# Patient Record
Sex: FEMALE | Race: WHITE
[De-identification: names, ages, dates, MRNs, and addresses within clinical notes are randomized per-mention and may not be internally consistent; named-entity substitution may affect disease eponyms.]

---

## 2019-02-07 ENCOUNTER — HOSPITAL ENCOUNTER (OUTPATIENT)
Dept: HOSPITAL 75 - RAD | Age: 44
End: 2019-02-07
Attending: NURSE PRACTITIONER
Payer: COMMERCIAL

## 2019-02-07 DIAGNOSIS — N85.8: ICD-10-CM

## 2019-02-07 DIAGNOSIS — Z12.31: Primary | ICD-10-CM

## 2019-02-07 PROCEDURE — 76856 US EXAM PELVIC COMPLETE: CPT

## 2019-02-07 PROCEDURE — 77067 SCR MAMMO BI INCL CAD: CPT

## 2019-02-07 PROCEDURE — 76830 TRANSVAGINAL US NON-OB: CPT

## 2019-02-07 NOTE — DIAGNOSTIC IMAGING REPORT
INDICATION: Right-sided pelvic pain.



Pelvic sonography is performed with transabdominal and

transvaginal views.



The uterus measures 11.6 x 7.1 x 5.9 cm. Endometrium appears

thickened measuring 1.8 cm. The right ovary appears normal and

measures 4.4 x 2.3 x 2.6 cm. The right ovary contains color flow.

Left ovary could not be visualized.



IMPRESSION: Thickened endometrium, correlate with menstrual

phase. No uterine mass is seen. Normal-appearing right ovary,

left ovary not visualized. There is no free fluid.



Dictated by: 



  Dictated on workstation # [id number]

## 2019-03-26 ENCOUNTER — HOSPITAL ENCOUNTER (OUTPATIENT)
Dept: HOSPITAL 75 - PREOP | Age: 44
Discharge: HOME | End: 2019-03-26
Attending: OBSTETRICS & GYNECOLOGY
Payer: COMMERCIAL

## 2019-03-26 VITALS — WEIGHT: 247 LBS | HEIGHT: 68 IN | BODY MASS INDEX: 37.44 KG/M2

## 2019-03-26 DIAGNOSIS — Z01.818: Primary | ICD-10-CM

## 2019-03-28 ENCOUNTER — HOSPITAL ENCOUNTER (OUTPATIENT)
Dept: HOSPITAL 75 - SDC | Age: 44
Discharge: HOME | End: 2019-03-28
Attending: OBSTETRICS & GYNECOLOGY
Payer: COMMERCIAL

## 2019-03-28 VITALS — DIASTOLIC BLOOD PRESSURE: 69 MMHG | SYSTOLIC BLOOD PRESSURE: 117 MMHG

## 2019-03-28 VITALS — SYSTOLIC BLOOD PRESSURE: 114 MMHG | DIASTOLIC BLOOD PRESSURE: 70 MMHG

## 2019-03-28 VITALS — DIASTOLIC BLOOD PRESSURE: 79 MMHG | SYSTOLIC BLOOD PRESSURE: 114 MMHG

## 2019-03-28 VITALS — WEIGHT: 242 LBS | HEIGHT: 68 IN | BODY MASS INDEX: 36.68 KG/M2

## 2019-03-28 VITALS — DIASTOLIC BLOOD PRESSURE: 56 MMHG | SYSTOLIC BLOOD PRESSURE: 96 MMHG

## 2019-03-28 VITALS — SYSTOLIC BLOOD PRESSURE: 117 MMHG | DIASTOLIC BLOOD PRESSURE: 69 MMHG

## 2019-03-28 DIAGNOSIS — N93.9: Primary | ICD-10-CM

## 2019-03-28 DIAGNOSIS — E66.9: ICD-10-CM

## 2019-03-28 DIAGNOSIS — N84.0: ICD-10-CM

## 2019-03-28 DIAGNOSIS — R93.89: ICD-10-CM

## 2019-03-28 LAB
BASOPHILS # BLD AUTO: 0 10^3/UL (ref 0–0.1)
BASOPHILS NFR BLD AUTO: 0 % (ref 0–10)
EOSINOPHIL # BLD AUTO: 0.2 10^3/UL (ref 0–0.3)
EOSINOPHIL NFR BLD AUTO: 3 % (ref 0–10)
ERYTHROCYTE [DISTWIDTH] IN BLOOD BY AUTOMATED COUNT: 13.7 % (ref 10–14.5)
HCT VFR BLD CALC: 40 % (ref 35–52)
HGB BLD-MCNC: 14.1 G/DL (ref 11.5–16)
LYMPHOCYTES # BLD AUTO: 2.4 X 10^3 (ref 1–4)
LYMPHOCYTES NFR BLD AUTO: 38 % (ref 12–44)
MANUAL DIFFERENTIAL PERFORMED BLD QL: NO
MCH RBC QN AUTO: 32 PG (ref 25–34)
MCHC RBC AUTO-ENTMCNC: 35 G/DL (ref 32–36)
MCV RBC AUTO: 89 FL (ref 80–99)
MONOCYTES # BLD AUTO: 0.6 X 10^3 (ref 0–1)
MONOCYTES NFR BLD AUTO: 9 % (ref 0–12)
NEUTROPHILS # BLD AUTO: 3.2 X 10^3 (ref 1.8–7.8)
NEUTROPHILS NFR BLD AUTO: 50 % (ref 42–75)
PLATELET # BLD: 313 10^3/UL (ref 130–400)
PMV BLD AUTO: 10.5 FL (ref 7.4–10.4)
WBC # BLD AUTO: 6.4 10^3/UL (ref 4.3–11)

## 2019-03-28 PROCEDURE — 86850 RBC ANTIBODY SCREEN: CPT

## 2019-03-28 PROCEDURE — 87081 CULTURE SCREEN ONLY: CPT

## 2019-03-28 PROCEDURE — 84703 CHORIONIC GONADOTROPIN ASSAY: CPT

## 2019-03-28 PROCEDURE — 36415 COLL VENOUS BLD VENIPUNCTURE: CPT

## 2019-03-28 PROCEDURE — 85025 COMPLETE CBC W/AUTO DIFF WBC: CPT

## 2019-03-28 PROCEDURE — 86901 BLOOD TYPING SEROLOGIC RH(D): CPT

## 2019-03-28 PROCEDURE — 86900 BLOOD TYPING SEROLOGIC ABO: CPT

## 2019-03-28 PROCEDURE — 88305 TISSUE EXAM BY PATHOLOGIST: CPT

## 2019-03-28 NOTE — XMS REPORT
Continuity of Care Document

 Created on: 2019



ASHLEIGH MARCANO

External Reference #: [id number]

: 1975

Sex: Female



Demographics







 Address  [address] [place]  [address]

 

 Home Phone  [phone] x

 

 Preferred Language  Unknown

 

 Marital Status  Unknown

 

 Restoration Affiliation  Unknown

 

 Race  Unknown

 

 Ethnic Group  Unknown





Author







 Author  [facility]

 

 Organization  [facility]

 

 Address  Unknown

 

 Phone  Unavailable



              



Allergies

      





 Active            Description            Code            Type            
Severity            Reaction            Onset            Reported/Identified   
         Relationship to Patient            Clinical Status        

 

 Yes            No Known Drug Allergies            [id number]            Drug 
Allergy            Unknown            N/A                         2013   
                               



                  



Medications

      



There is no data.                  



Problems

      





 Date Dx Coded            Attending            Type            Code            
Diagnosis            Diagnosed By        

 

 2013            LAKEISHA JONES DO            Ot            599.0          
  URIN TRACT INFECTION NOS                     

 

 2013            LAKEISHA JONES DO            Ot            788.1          
  DYSURIA                     

 

 2015                         Ot            708.9            URTICARIA 
NOS                     

 

 2016            BIBI GARCIA MD            Ot            L97.122    
        NON-PRESSURE CHRONIC ULCER OF LEFT THIGH                     

 

 2016            BIBI GARCIA MD            Ot            T63.331A   
         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                     

 

 2017                         Ot            L97.122            NON-
PRESSURE CHRONIC ULCER OF LEFT THIGH                     

 

 2017                         Ot            T63.331A            TOXIC 
EFFECT OF VENOM OF BROWN RECLUSE S                     

 

 2019                         Ot            L97.122            NON-
PRESSURE CHRONIC ULCER OF LEFT THIGH                     

 

 2019                         Ot            T63.331A            TOXIC 
EFFECT OF VENOM OF BROWN RECLUSE S                     

 

 2019            MEI VALVERDE            Ot            N85.8        
    OTHER SPECIFIED NONINFLAMMATORY DISORDER                     

 

 2019            MEI VALVERDE            Ot            Z12.31       
     ENCNTR SCREEN MAMMOGRAM FOR MALIGNANT NE                     

 

 2019            MEI VALVERDE            Ot            N85.8        
    OTHER SPECIFIED NONINFLAMMATORY DISORDER                     

 

 2019            MEI VALVERDE            Ot            Z12.31       
     ENCNTR SCREEN MAMMOGRAM FOR MALIGNANT NE                     

 

 2019            BIBI GLOVER DO            Ot            Z01.818   
         ENCOUNTER FOR OTHER PREPROCEDURAL EXAMIN                     

 

 2019            BIBI GLOVER DO            Ot            Z01.818   
         ENCOUNTER FOR OTHER PREPROCEDURAL EXAMIN                     



                                              



Procedures

      



There is no data.                  



Results

      



There is no data.              



Encounters

      





 ACCT No.            Visit Date/Time            Discharge            Status    
        Pt. Type            Provider            Facility            Loc./Unit  
          Complaint        

 

 [id number]            2019 09:59:00            2019 15:34:00    
        DIS            Outpatient            BIBI GLOVER DO            
[facility]            PREOP            ABNORMAL UTERINE 
BLEEDING        

 

 [id number]            2019 14:50:00            2019 23:59:59    
        CLS            Outpatient            MEI VALVERDE            [facility]            RAD            SCREENING/RLQ DISCOMFORT
        

 

 [id number]            2016 08:53:00            2016 23:59:59    
        CLS            Outpatient            BIBI GARCIA MD            [facility]            WOUNDCARE                     

 

 [id number]            2013 23:46:00            2013 01:21:00    
        DIS            Emergency            ROBERT LAKEISHA OROZCO JACKELIN            [facility]            ER            UTI        

 

 [id number]            2019 10:15:00                         PEN       
     Preadmit            BIBI GLOVER DO            [facility]            SDC            ABNORMAL UTERINE BLEEDING        

 

 [id number]            2016 00:00:00                                   
   Document Registration                                                       
     

 

 [id number]            2015 22:26:00                                   
   Document Registration

## 2019-03-28 NOTE — ANESTHESIA-GENERAL POST-OP
General


Patient Condition


Mental Status/LOC:  Same as Preop


Cardiovascular:  Satisfactory


Nausea/Vomiting:  Absent


Respiratory:  Satisfactory


Pain:  Controlled


Complications:  Absent





Post Op Complications


Complications


None





Follow Up Care/Instructions


Patient Instructions


None needed.





Anesthesia/Patient Condition


Patient Condition


Patient was seen after the procedure and she was doing well, no complaints, 

stable vital signs, no apparent adverse anesthesia problems.











KAMLESH RANKIN DO Mar 28, 2019 14:47

## 2019-03-28 NOTE — OPERATIVE REPORT
DATE OF SERVICE:  03/28/2019



PREOPERATIVE DIAGNOSES:

1.  A 44-year-old female with abnormal uterine bleeding.

2.  Thickened endometrium.



POSTOPERATIVE DIAGNOSES:

1.  A 44-year-old female with abnormal uterine bleeding.

2.  Thickened endometrium.

3.  Endometrial polypoid tissue.



PROCEDURE PERFORMED:

D and C hysteroscopy.



SURGEON:

Bibi Glover DO.



ANESTHESIA:

General endotracheal.



ESTIMATED BLOOD LOSS:

Minimal.



URINE OUTPUT:

200 mL clear at the end of the procedure.



FLUIDS:

600 mL lactated Ringer solution.



FINDINGS:

Two separate polypoid structures in the endometrial cavity, otherwise grossly

normal-appearing endometrial cavity without evidence of submucosal fibroid or

other neoplasm.



SPECIMENS SENT:

Endometrial curettings.



INDICATIONS FOR PROCEDURE:

This 44-year-old female was sent to my office for evaluation and referral from

Angeles Delgado, our nurse practitioner for finding of abnormal uterine bleeding over

the age of 39 and thickened endometrium on ultrasound.  I discussed with the

patient to perform an EMB in the office versus proceed with D and C in the

operating room.  We did discuss how D and C could be both curative and

diagnostic versus EMB would probably only serve as a diagnostic test primarily. 

All of her questions were answered with her  present including recovery

time frame, risks of the procedure as well as follow up.  After everything was

discussed and all of their questions were answered, consent was obtained, the

patient was taken to the operating room.



DESCRIPTION OF PROCEDURE:

Once in the operating room, general anesthesia was found to be adequate, placed

in dorsal lithotomy position, prepped and draped in normal sterile fashion.  A

weighted speculum was inserted in the patient's vagina.  A right angle retractor

was used to visualize the cervix, which was grasped at 12 o'clock position using

a long Allis clamp.  A paracervical block was then performed at 3 o'clock and 9

o'clock positions.  A 0.25% Marcaine, 5 mL were injected at each site after

which I then proceeded to sound the uterine cavity depth, which is found to be 8

cm.  I then gently dilated the cervix using Hegar dilators for approximately 5-8

mm, allowing me to place a hysteroscope.  Using the [facility] fluid management

system and normal saline as my visual medium, I advanced the hysteroscope in the

intrauterine cavity and able to identify my findings as listed above.  After

this was done, I removed the hysteroscope and proceeded with performing a

curetting directed toward these 2 polyps.  A small to moderate amount of

polypoid tissue was removed by doing so after which there was no active bleeding

noted from any of my dissection planes.  The patient tolerated the procedure

well and sent to Recovery in stable condition.  All other instruments were

removed from the patient's vagina at that point.  Lap and sponge counts were

correct at the end of the procedure.  Instrument counts were correct as well.





Job ID: [id number]

DocumentID: [id number]

Dictated Date:  03/28/2019 11:42:57

Transcription Date: 03/28/2019 20:18:58

Dictated By: BIBI GLOVER DO

## 2019-03-28 NOTE — DISCHARGE INST-WOMEN'S SERVICE
Discharge Inst-Women's Serv


Depart Medication/Instructions


New, Converted or Re-Newed RX:  Other (no scripts patient to take own Advil as 

needed)





Consults/Follow Up


Orders/Referrals


Dr. Glover in 1-2 weeks





Diet


Discharge Diet:  No Restrictions


Symptoms to Report to :  Bleeding Excessive, Pain Increased, Fever Over 101 

Degrees F, Vaginal Bleeding Increase, Questions/Concerns


For Any Problems or Questions:  Contact Your Physician











BIBI GLOVER DO Mar 28, 2019 09:42

## 2021-05-18 ENCOUNTER — HOSPITAL ENCOUNTER (OUTPATIENT)
Dept: HOSPITAL 75 - RAD | Age: 46
End: 2021-05-18
Attending: OBSTETRICS & GYNECOLOGY
Payer: COMMERCIAL

## 2021-05-18 DIAGNOSIS — Z12.31: Primary | ICD-10-CM

## 2021-05-18 DIAGNOSIS — N88.8: ICD-10-CM

## 2021-05-18 PROCEDURE — 76830 TRANSVAGINAL US NON-OB: CPT

## 2021-05-18 PROCEDURE — 76856 US EXAM PELVIC COMPLETE: CPT

## 2021-05-18 PROCEDURE — 77067 SCR MAMMO BI INCL CAD: CPT

## 2021-05-18 PROCEDURE — 77063 BREAST TOMOSYNTHESIS BI: CPT

## 2021-05-18 NOTE — DIAGNOSTIC IMAGING REPORT
INDICATION: Routine screening.



COMPARISON is made with prior mammogram 02/07/2019.



2-D and 3-D bilateral screening mammography was performed with

CAD.



Both breasts are heterogeneously dense, limiting the sensitivity

of mammography. The parenchymal pattern is stable. No mass or

malignant appearing microcalcifications are seen. Axillae are

unremarkable.



IMPRESSION: BI-RADS Category 1



No mammographic features suspicious for malignancy are

identified.



ACR BI-RADS Category 1: Negative.

Result letter will be mailed to the patient.

Note:  At least 10% of breast cancer is not imaged by

mammography.



Dictated by: 



  Dictated on workstation # [id number]

## 2021-05-18 NOTE — DIAGNOSTIC IMAGING REPORT
PROCEDURE: US Non-ob pelvis comp/trans.



TECHNIQUE: Multiple realtime grayscale images were obtained of

the pelvis in various projections endovaginally.



Transabdominal imaging was also performed.



INDICATION: Right-sided pelvic pain during menses.



COMPARISON: Comparison with 02/07/2019, pelvic ultrasound.



FINDINGS: Uterus measures 8.6 x 4.8 x 6.4 cm. Endometrial stripe

is 1.4 cm. There is a trace of endometrial fluid. There are no

endometrial or myometrial masses. The right ovary measures 3 x

1.2 x 2.4 cm. The left ovary measures 3.8 x 2 x 2 cm. There is

normal blood flow to both ovaries. There are no ovarian cysts.

There is no free fluid in the pelvis. There is noted a simple

nabothian cyst in the cervix measuring less than 1 cm.



IMPRESSION:

1. No evidence of ovarian cysts. There is a trace of free fluid

in the cul-de-sac.

2. Simple nabothian cyst in the cervix.



Dictated by: 



  Dictated on workstation # [id number]

## 2022-02-23 ENCOUNTER — HOSPITAL ENCOUNTER (OUTPATIENT)
Dept: HOSPITAL 75 - RAD | Age: 47
End: 2022-02-23
Attending: FAMILY MEDICINE
Payer: COMMERCIAL

## 2022-02-23 DIAGNOSIS — M48.02: Primary | ICD-10-CM

## 2022-02-23 DIAGNOSIS — M47.812: ICD-10-CM

## 2022-02-23 DIAGNOSIS — M50.121: ICD-10-CM

## 2022-02-23 PROCEDURE — 72141 MRI NECK SPINE W/O DYE: CPT

## 2022-02-23 PROCEDURE — 73030 X-RAY EXAM OF SHOULDER: CPT

## 2022-02-23 NOTE — DIAGNOSTIC IMAGING REPORT
INDICATION:  LEFT ARM PARESTHESIA LT SHOULDER PAIN CERVICALGIA

WITH LEFT RADICULOPATHY



TECHNIQUE: Three  views of the  left shoulder  



CORRELATION STUDY:  None



FINDINGS: 

The glenohumeral and acromioclavicular alignment are maintained

and unremarkable. There is no evidence for acute fracture or

dislocation.  The visualized soft tissues are unremarkable.



IMPRESSION: 

1.  Negative for acute bony abnormality about the shoulder.



Dictated by: 



  Dictated on workstation # [id number]

## 2022-02-23 NOTE — DIAGNOSTIC IMAGING REPORT
PROCEDURE: 

MR imaging cervical spine without contrast.



TECHNIQUE: 

Multiplanar, multisequence MR imaging of the cervical spine was

performed without contrast.



INDICATION:   

Neck pain, left shoulder pain, hand tingling and numbness. 



FINDINGS:

The cervical vertebral statures and alignment are normal. The

marrow signal intensity is normal. The cervical spinal cord

itself has normal volume, morphology, and signal intensity. The

ligamentous structures show no evidence for their rupture. There

is no paraspinal mass, hemorrhage, or fluid collection. There is

no acute intrathecal abnormality.



The craniocervical relationship at the C1 and C2 levels is

normal.



C2-C3:

This level and disc are normal. No stenosis.



C3-C4: 

This level and disc are normal. No stenosis.



C4-C5: 

There is disc desiccation, slight disc stature loss, and mild

circumferential annular bulge. There is some mild right-sided

uncovertebral joint spurring. There is no significant canal

stenosis. The left neuroforamen is widely patent. The right is

mildly narrowed.



C5-C6: 

There is disc desiccation, disc stature loss, and broad-based

posterior disc protrusion elevating the unruptured posterior

longitudinal ligament. Effacing and flattening of the ventral

thecal sac result in severe canal stenosis. There is moderate

left and severe right neuroforaminal narrowing with right greater

than left uncovertebral joint spurring.



C6-C7: 

Disc desiccation, stature loss, and broad-based posterior bulging

flattens and effaces the ventral thecal sac and severely stenose

the spinal canal. This mildly effaces the ventral cord surface

but no cord edema. There is moderate to severe left and moderate

right neuroforaminal stenoses.



C7-T1: 

This level and disc are unremarkable. There is no stenosis.



IMPRESSION:

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

spinal stenosis detailed above. No bone contusion, marrow edema,

or cord signal pathology.



Dictated by: 



  Dictated on workstation # [id number]

## 2022-08-30 ENCOUNTER — HOSPITAL ENCOUNTER (OUTPATIENT)
Dept: HOSPITAL 75 - ER | Age: 47
LOS: 1 days | Discharge: HOME | End: 2022-08-31
Attending: SURGERY
Payer: COMMERCIAL

## 2022-08-30 VITALS — SYSTOLIC BLOOD PRESSURE: 153 MMHG | DIASTOLIC BLOOD PRESSURE: 76 MMHG

## 2022-08-30 VITALS — SYSTOLIC BLOOD PRESSURE: 125 MMHG | DIASTOLIC BLOOD PRESSURE: 82 MMHG

## 2022-08-30 VITALS — SYSTOLIC BLOOD PRESSURE: 130 MMHG | DIASTOLIC BLOOD PRESSURE: 80 MMHG

## 2022-08-30 VITALS — SYSTOLIC BLOOD PRESSURE: 132 MMHG | DIASTOLIC BLOOD PRESSURE: 67 MMHG

## 2022-08-30 VITALS — DIASTOLIC BLOOD PRESSURE: 64 MMHG | SYSTOLIC BLOOD PRESSURE: 117 MMHG

## 2022-08-30 VITALS — DIASTOLIC BLOOD PRESSURE: 81 MMHG | SYSTOLIC BLOOD PRESSURE: 137 MMHG

## 2022-08-30 VITALS — BODY MASS INDEX: 30.11 KG/M2 | WEIGHT: 203.27 LBS | HEIGHT: 69.02 IN

## 2022-08-30 VITALS — DIASTOLIC BLOOD PRESSURE: 67 MMHG | SYSTOLIC BLOOD PRESSURE: 132 MMHG

## 2022-08-30 VITALS — SYSTOLIC BLOOD PRESSURE: 128 MMHG | DIASTOLIC BLOOD PRESSURE: 82 MMHG

## 2022-08-30 VITALS — DIASTOLIC BLOOD PRESSURE: 81 MMHG | SYSTOLIC BLOOD PRESSURE: 129 MMHG

## 2022-08-30 VITALS — SYSTOLIC BLOOD PRESSURE: 124 MMHG | DIASTOLIC BLOOD PRESSURE: 77 MMHG

## 2022-08-30 VITALS — DIASTOLIC BLOOD PRESSURE: 80 MMHG | SYSTOLIC BLOOD PRESSURE: 134 MMHG

## 2022-08-30 VITALS — SYSTOLIC BLOOD PRESSURE: 130 MMHG | DIASTOLIC BLOOD PRESSURE: 60 MMHG

## 2022-08-30 DIAGNOSIS — K80.12: Primary | ICD-10-CM

## 2022-08-30 LAB
ALBUMIN SERPL-MCNC: 4.1 GM/DL (ref 3.2–4.5)
ALP SERPL-CCNC: 87 U/L (ref 40–136)
ALT SERPL-CCNC: 372 U/L (ref 0–55)
APTT PPP: (no result) S
BACTERIA #/AREA URNS HPF: NEGATIVE /HPF
BARBITURATES UR QL: NEGATIVE
BASOPHILS # BLD AUTO: 0 10^3/UL (ref 0–0.1)
BASOPHILS NFR BLD AUTO: 0 % (ref 0–10)
BENZODIAZ UR QL SCN: NEGATIVE
BILIRUB SERPL-MCNC: 2.2 MG/DL (ref 0.1–1)
BILIRUB UR QL STRIP: (no result)
BUN/CREAT SERPL: 15
CALCIUM SERPL-MCNC: 9.2 MG/DL (ref 8.5–10.1)
CHLORIDE SERPL-SCNC: 103 MMOL/L (ref 98–107)
CO2 SERPL-SCNC: 23 MMOL/L (ref 21–32)
COCAINE UR QL: NEGATIVE
CREAT SERPL-MCNC: 0.71 MG/DL (ref 0.6–1.3)
EOSINOPHIL # BLD AUTO: 0.1 10^3/UL (ref 0–0.3)
EOSINOPHIL NFR BLD AUTO: 1 % (ref 0–10)
FIBRINOGEN PPP-MCNC: CLEAR MG/DL
GFR SERPLBLD BASED ON 1.73 SQ M-ARVRAT: 105 ML/MIN
GLUCOSE SERPL-MCNC: 126 MG/DL (ref 70–105)
GLUCOSE UR STRIP-MCNC: NEGATIVE MG/DL
HCT VFR BLD CALC: 42 % (ref 35–52)
HGB BLD-MCNC: 14.5 G/DL (ref 11.5–16)
KETONES UR QL STRIP: (no result)
LEUKOCYTE ESTERASE UR QL STRIP: NEGATIVE
LIPASE SERPL-CCNC: 28 U/L (ref 8–78)
LYMPHOCYTES # BLD AUTO: 1.9 10^3/UL (ref 1–4)
LYMPHOCYTES NFR BLD AUTO: 21 % (ref 12–44)
MANUAL DIFFERENTIAL PERFORMED BLD QL: NO
MCH RBC QN AUTO: 32 PG (ref 25–34)
MCHC RBC AUTO-ENTMCNC: 34 G/DL (ref 32–36)
MCV RBC AUTO: 93 FL (ref 80–99)
METHADONE UR QL SCN: NEGATIVE
MONOCYTES # BLD AUTO: 0.7 10^3/UL (ref 0–1)
MONOCYTES NFR BLD AUTO: 8 % (ref 0–12)
NEUTROPHILS # BLD AUTO: 6.2 10^3/UL (ref 1.8–7.8)
NEUTROPHILS NFR BLD AUTO: 70 % (ref 42–75)
NITRITE UR QL STRIP: NEGATIVE
OPIATES UR QL SCN: NEGATIVE
OXYCODONE UR QL: NEGATIVE
PH UR STRIP: 6.5 [PH] (ref 5–9)
PLATELET # BLD: 331 10^3/UL (ref 130–400)
PMV BLD AUTO: 10.5 FL (ref 9–12.2)
POTASSIUM SERPL-SCNC: 3.7 MMOL/L (ref 3.6–5)
PROPOXYPH UR QL: NEGATIVE
PROT SERPL-MCNC: 7.1 GM/DL (ref 6.4–8.2)
PROT UR QL STRIP: (no result)
RBC #/AREA URNS HPF: (no result) /HPF
SODIUM SERPL-SCNC: 138 MMOL/L (ref 135–145)
SP GR UR STRIP: 1.01 (ref 1.02–1.02)
SQUAMOUS #/AREA URNS HPF: (no result) /HPF
TRICYCLICS UR QL SCN: NEGATIVE
WBC # BLD AUTO: 8.9 10^3/UL (ref 4.3–11)
WBC #/AREA URNS HPF: (no result) /HPF

## 2022-08-30 PROCEDURE — 87081 CULTURE SCREEN ONLY: CPT

## 2022-08-30 PROCEDURE — 76705 ECHO EXAM OF ABDOMEN: CPT

## 2022-08-30 PROCEDURE — 80053 COMPREHEN METABOLIC PANEL: CPT

## 2022-08-30 PROCEDURE — 96361 HYDRATE IV INFUSION ADD-ON: CPT

## 2022-08-30 PROCEDURE — 80306 DRUG TEST PRSMV INSTRMNT: CPT

## 2022-08-30 PROCEDURE — 83690 ASSAY OF LIPASE: CPT

## 2022-08-30 PROCEDURE — 96360 HYDRATION IV INFUSION INIT: CPT

## 2022-08-30 PROCEDURE — 85025 COMPLETE CBC W/AUTO DIFF WBC: CPT

## 2022-08-30 PROCEDURE — 84484 ASSAY OF TROPONIN QUANT: CPT

## 2022-08-30 PROCEDURE — 84703 CHORIONIC GONADOTROPIN ASSAY: CPT

## 2022-08-30 PROCEDURE — 81000 URINALYSIS NONAUTO W/SCOPE: CPT

## 2022-08-30 PROCEDURE — 36415 COLL VENOUS BLD VENIPUNCTURE: CPT

## 2022-08-30 PROCEDURE — 74177 CT ABD & PELVIS W/CONTRAST: CPT

## 2022-08-30 PROCEDURE — 76000 FLUOROSCOPY <1 HR PHYS/QHP: CPT

## 2022-08-30 RX ADMIN — ONDANSETRON ONE MG: 2 INJECTION, SOLUTION INTRAMUSCULAR; INTRAVENOUS at 03:34

## 2022-08-30 RX ADMIN — SODIUM CHLORIDE SCH MLS/HR: 900 INJECTION, SOLUTION INTRAVENOUS at 14:45

## 2022-08-30 RX ADMIN — SODIUM CHLORIDE, SODIUM LACTATE, POTASSIUM CHLORIDE, AND CALCIUM CHLORIDE PRN MLS/HR: 600; 310; 30; 20 INJECTION, SOLUTION INTRAVENOUS at 16:45

## 2022-08-30 RX ADMIN — SODIUM CHLORIDE SCH MLS/HR: 900 INJECTION, SOLUTION INTRAVENOUS at 08:12

## 2022-08-30 RX ADMIN — KETOROLAC TROMETHAMINE ONE MG: 30 INJECTION, SOLUTION INTRAMUSCULAR; INTRAVENOUS at 03:34

## 2022-08-30 RX ADMIN — ONDANSETRON ONE MG: 2 INJECTION, SOLUTION INTRAMUSCULAR; INTRAVENOUS at 03:31

## 2022-08-30 RX ADMIN — SODIUM CHLORIDE SCH MLS/HR: 900 INJECTION, SOLUTION INTRAVENOUS at 19:42

## 2022-08-30 RX ADMIN — SODIUM CHLORIDE, SODIUM LACTATE, POTASSIUM CHLORIDE, AND CALCIUM CHLORIDE PRN MLS/HR: 600; 310; 30; 20 INJECTION, SOLUTION INTRAVENOUS at 17:47

## 2022-08-30 RX ADMIN — KETOROLAC TROMETHAMINE ONE MG: 30 INJECTION, SOLUTION INTRAMUSCULAR; INTRAVENOUS at 03:31

## 2022-08-30 NOTE — CONSULTATION - SURGERY
REGIS MORROW 22 1025:


History of Present Illness


History of Present Illness


Patient Consulted On(mike/time)


22


 10:20


Date Seen by Provider:  Aug 30, 2022


Time Seen by Provider:  07:55


History of Present Illness


Pt is 46yo F who presented to ER with abdominal pain and nausea following a 

meal. This is the 3rd such attack she has had in the last 18 months or so. The 

pain is epigastric, sharp and all the way through to the back. She denies and 

sweats/chills or fever. She says the pain is usually caused by eating sweet 

meals with high sugar. The pain is improved by switching her diet over to keto 

shakes for the next few days. She also recalls having some pain and vomiting 

after eating ribs one time.





Allergies and Home Medications


Allergies


Coded Allergies:  


     No Known Drug Allergies (Unverified , 13)





Patient Home Medication List


Cranberry Extract/Vit C (Azo Cranberry Softgel) 250 Mg-60 Mg Capsule, 1 EACH PO 

DAILY, (Reported)


   Entered as Reported by: GINA MANCIA on 22


   Last Action: Reviewed


Vitamin E Mixed (Vitamin E) 1,000 Unit Capsule, 1,000 UNIT PO DAILY, (Reported)


   Entered as Reported by: GINA MANCIA on 22


   Last Action: Reviewed


[Magnesium Malate]  , 1 EA PO 1800, (Reported)


   Entered as Reported by: GINA MANCIA on 22


   Last Action: Reviewed





Past Medical-Social-Family Hx


Patient Social History


Smoking Status:  Never a Smoker


2nd Hand Smoke Exposure:  No


Recent Hopitalizations:  No


Alcohol Use?:  No


Have you traveled recently?:  No





Immunizations Up To Date


Tetanus Booster (TDap):  More than 5yrs





Seasonal Allergies


Seasonal Allergies:  No





Surgeries


History of Surgeries:  Yes ( , dental implant)


Surgeries:   Section





Respiratory


History of Respiratory Disorde:  No





Cardiovascular


History of Cardiac Disorders:  No





Neurological


History of Neurological Disord:  No





Reproductive System


Hx Reproductive Disorders:  No


Sexually Transmitted Disease:  No


HIV/AIDS:  No





Genitourinary


History of Genitourinary Disor:  No


Genitourinary Disorders:  UTI-Chronic





Gastrointestinal


History of Gastrointestinal Di:  No





Musculoskeletal


History of Musculoskeletal Dis:  No





Endocrine


History of Endocrine Disorders:  No





HEENT


History of HEENT Disorders:  No


Loss of Vision:  Denies


Hearing Impairment:  Denies





Cancer


History of Cancer:  No





Psychosocial


History of Psychiatric Problem:  No





Integumentary


History of Skin or Integumenta:  No





Blood Transfusions


History of Blood Disorders:  No


Adverse Reaction to a Blood Tr:  No





Family Medical History


Significant Family History:  Heart Disease, Cancer, Hypertension





Review of Systems-General


Constitutional:  No chills, No diaphoresis, No fever


Respiratory:  No cough, No short of breath


Cardiovascular:  No chest pain


Gastrointestinal:  LUQ, abdominal pain (epigastric, sharp), nausea





Physical Exam-General Problems


Physical Exam


Vital Signs





Vital Signs - First Documented








 22





 02:52 08:00


 


Temp  37.2


 


Pulse 60 


 


Resp 18 


 


B/P (MAP) 111/75 (87) 


 


Pulse Ox 97 


 


O2 Delivery Room Air 





Capillary Refill :


General Appearance:  no apparent distress, obese


HEENT:  PERRL/EOMI


Neck:  supple


Respiratory:  lungs clear, normal breath sounds, no respiratory distress, no 

accessory muscle use


Cardiovascular:  regular rate, rhythm, no murmur


Gastrointestinal:  normal bowel sounds, soft, tenderness (epigastric)


Extremities:  no pedal edema, normal capillary refill


Neurologic/Psychiatric:  alert, normal mood/affect, oriented x 3


Skin:  normal color, warm/dry


Lymphatic:  no adenopathy (anterior/posterior cervical)





Data Review


Labs


Laboratory Tests


22 02:50: 


White Blood Count 8.9, Red Blood Count 4.54, Hemoglobin 14.5, Hematocrit 42, M

anabella Corpuscular Volume 93, Mean Corpuscular Hemoglobin 32, Mean Corpuscular 

Hemoglobin Concent 34, Red Cell Distribution Width 12.7, Platelet Count 331, 

Mean Platelet Volume 10.5, Immature Granulocyte % (Auto) 0, Neutrophils (%) 

(Auto) 70, Lymphocytes (%) (Auto) 21, Monocytes (%) (Auto) 8, Eosinophils (%) 

(Auto) 1, Basophils (%) (Auto) 0, Neutrophils # (Auto) 6.2, Lymphocytes # (Auto)

1.9, Monocytes # (Auto) 0.7, Eosinophils # (Auto) 0.1, Basophils # (Auto) 0.0, 

Immature Granulocyte # (Auto) 0.0, Sodium Level 138, Potassium Level 3.7, 

Chloride Level 103, Carbon Dioxide Level 23, Anion Gap 12, Blood Urea Nitrogen 

11, Creatinine 0.71, Estimat Glomerular Filtration Rate 105, BUN/Creatinine 

Ratio 15, Glucose Level 126H, Calcium Level 9.2, Corrected Calcium 9.1, Total 

Bilirubin 2.2H, Aspartate Amino Transf (AST/SGOT) 417H, Alanine Aminotransferase

(ALT/SGPT) 372H, Alkaline Phosphatase 87, Troponin I < 0.028, Total Protein 7.1,

Albumin 4.1, Lipase 28


22 04:30: 


Urine Color ORANGE, Urine Clarity CLEAR, Urine pH 6.5, Urine Specific Gravity 

1.015L, Urine Protein TRACEH, Urine Glucose (UA) NEGATIVE, Urine Ketones 2+H, 

Urine Nitrite NEGATIVE, Urine Bilirubin 1+H, Urine Urobilinogen 2.0, Urine 

Leukocyte Esterase NEGATIVE, Urine RBC (Auto) NEGATIVE, Urine RBC NONE, Urine 

WBC NONE, Urine Squamous Epithelial Cells 2-5, Urine Crystals NONE, Urine 

Bacteria NEGATIVE, Urine Casts NONE, Urine Mucus MODERATEH, Urine Culture 

Indicated NO, Urine Opiates Screen NEGATIVE, Urine Oxycodone Screen NEGATIVE, 

Urine Methadone Screen NEGATIVE, Urine Propoxyphene Screen NEGATIVE, Urine 

Barbiturates Screen NEGATIVE, Ur Tricyclic Antidepressants Screen NEGATIVE, 

Urine Phencyclidine Screen NEGATIVE, Urine Amphetamines Screen NEGATIVE, Urine 

Methamphetamines Screen NEGATIVE, Urine Benzodiazepines Screen NEGATIVE, Urine 

Cocaine Screen NEGATIVE, Urine Cannabinoids Screen NEGATIVE





Assessment/Plan


Cholelithiasis


Abdominal pain


Nausea


Vomiting





   CT scan revealed stones in the gallbladder. This is her third attack in the 

last 18 months. Recommend she proceed with cholecystectomy. I discussed the 

operation with the patient including the risks/benefits and answered all 

questions that she had.





ASIM MENDOZA DO 22 1500:


History of Present Illness


History of Present Illness


Time Seen by Provider:  11:26


History of Present Illness


Surgery asked to consult/admit for RUQ pain with elevate bilirubin and dilated 

CBD





HPI:  Pt presented to ER with epigastric and upper abdominal pain.  Found to 

have elevated bilirubin and CT showed dilated CBD (recommended MRCP).  Pt 

describes pain as sharp stabbing pain, this has been the worst it has ever been;

rated as 8 out of 10.  She thinks the first time she had something like this was

5 years ago.  Avoiding certain foods makes it better.





Allergies and Home Medications


Allergies


Coded Allergies:  


     No Known Drug Allergies (Unverified , 13)





Patient Home Medication List


Home Medication List Reviewed:  Yes


Cranberry Extract/Vit C (Azo Cranberry Softgel) 250 Mg-60 Mg Capsule, 1 EACH PO 

DAILY, (Reported)


   Entered as Reported by: GINA MANCIA on 22


   Last Action: Reviewed


Vitamin E Mixed (Vitamin E) 1,000 Unit Capsule, 1,000 UNIT PO DAILY, (Reported)


   Entered as Reported by: GINA MANCIA on 22


   Last Action: Reviewed


[Magnesium Malate]  , 1 EA PO 1800, (Reported)


   Entered as Reported by: GINA MANCIA on 22


   Last Action: Reviewed





Past Medical-Social-Family Hx


Patient Social History


Smoking Status:  Never a Smoker


Alcohol Use?:  No





Surgeries


History of Surgeries:  Yes


Surgeries:   Section





Respiratory


History of Respiratory Disorde:  No





Cardiovascular


History of Cardiac Disorders:  No





Neurological


History of Neurological Disord:  No





Genitourinary


History of Genitourinary Disor:  No





Gastrointestinal


History of Gastrointestinal Di:  Yes (dairy intolerance)





Musculoskeletal


History of Musculoskeletal Dis:  No





Endocrine


History of Endocrine Disorders:  No





HEENT


Loss of Vision:  Denies


Hearing Impairment:  Hard of Hearing





Cancer


History of Cancer:  No





Psychosocial


History of Psychiatric Problem:  No





Family Medical History


Significant Family History:  Heart Disease, Cancer, Hypertension





Review of Systems-General


Constitutional:  No chills, No diaphoresis, No fever


EENTM:  No blurred vision, No mouth swelling, No epistaxis


Respiratory:  No cough, No short of breath


Cardiovascular:  No chest pain, No palpitations


Gastrointestinal:  abdominal pain (epigastric, sharp); No hematemesis, No 

jaundice; nausea; No vomiting


Genitourinary:  No dysuria, No frequency, No hematuria


Musculoskeletal:  No joint pain, No joint swelling, No muscle pain


Skin:  No change in color, No change in hair/nails


Psychiatric/Neurological:  Denies Anxiety, Denies Depressed, Denies Seizure, 

Denies Tremors





Physical Exam-General Problems


Physical Exam


General Appearance:  mild distress (secondary to pain), obese


Eyes:  Bilateral Eye PERRL, Bilateral Eye EOMI


HEENT:  pharynx normal; No scleral icterus (R), No scleral icterus (L)


Neck:  non-tender, supple


Respiratory:  lungs clear, normal breath sounds, no respiratory distress, no 

accessory muscle use


Cardiovascular:  regular rate, rhythm, no murmur


Gastrointestinal:  normal bowel sounds, soft, no organomegaly, tenderness 

(epigastric), hernia (umbilical)


Back:  no CVA tenderness, no vertebral tenderness


Extremities:  no pedal edema, no calf tenderness, normal capillary refill


Neurologic/Psychiatric:  CNs II-XII nml as tested, alert, normal mood/affect, 

oriented x 3


Skin:  normal color, warm/dry


Lymphatic:  no adenopathy (anterior/posterior cervical, axillary or groin)





Data Review


Radiology


Date of Exam:22





US GALLBLADDER [id number]








CLINICAL INDICATION: Patient with right upper quadrant pain.





EXAM: Right upper quadrant ultrasound.





COMPARISON: CT scan of the abdomen and pelvis with contrast dated


2022.





FINDINGS:


There is bowel gas which obscures some portions of the pancreatic


tail. Otherwise visualized portion of pancreas is unremarkable.





The visualized portions of the abdominal aorta and IVC show no


significant abnormality.





Liver measures 14.8 cm. The liver has normal echogenicity and


echotexture. The liver surface is smooth. There is no liver mass.


The main portal vein demonstrates hepatopetal flow. Common bile


duct is dilated at 9 mm. There is no definite stone in the common


duct seen. There is no intrahepatic ductal dilation. There are


multiple echogenic areas seen within the gallbladder with no


significant posterior shadowing. These may represent stones


and/or sludge. Gallbladder is moderately fluid filled. The


gallbladder wall measures 3 mm. There is no sonographic Hendrix


sign.





Right kidney measures 11.9 cm in craniocaudal dimension.  There


is no hydronephrosis or mass. There is no abdominal ascites.





IMPRESSION:


1: There are multiple stones and/or sludge within the gallbladder


with mild gallbladder wall thickening. Gallbladder is moderately


distended. There is no sonographic Hendrix sign. There is dilation


of the common duct which measures 9 mm. There is no stone in the


region of the common hepatic duct. MRCP would help better


evaluate to exclude a stone in the common hepatic duct. Given the


above findings, acute cholecystitis cannot be completely


excluded.





2: The remainder of the exam shows no other significant


abnormality.





  Dictated on workstation # [id number]








Dict:   22 0910


Trans:   22 0920


CVB [id number]





Interpreted by:     BUDDY ADLER MD





Assessment/Plan


Cholelithiasis/Cholecysitis causing abdominal pain


Elevated Bilirubin


N/V





US revealed stones in the gallbladder and confirmed what CT showed; dilated CBD.

I reviewed the images myself and then went over them with Radiologist.  This is 

her third attack in the last 18 months; in addition she may have a retained CBD 

and/or possibly passed a stone. I went over options with pt:  1)  do nothing  2)

 MRCP and then surgery with possible ERCP  3) Laparoscopic Cholecystectomy with 

cholangiogram and may need ERCP.  She wants to proceed with cholecystectomy and 

cholangiogram. I discussed the operation with the patient including the 

risks/benefits not limited to pain, bleeding, infection, scar, damage to bowel 

or bile duct and need for furthe procedure.  All questions answered to her 

satisfaction.  Will get consent, pain control, anti-emetics as needed, IV 

fluids.





Supervisory-Addendum Brief


Verification & Attestation


Participated in pt care:  history, MDM, physical


Personally performed:  exam, history, MDM, supervision of care


Care discussed with:  Medical Student


Procedures:  n/a


Verification and Attestation of Medical Student E/M Service





A medical student performed and documented this service. I then reviewed and michelle

ified all information documented by the medical student and made modifications 

to such information, when appropriate. I personally performed a physical exam, 

medical decision making and then discussed any differences between the notes and

made revisions as necessary to create one note.





Asim Mendoza , 22 , 15:14











REGIS MORROW                   Aug 30, 2022 10:25


ASIM MENDOZA DO               Aug 30, 2022 15:00

## 2022-08-30 NOTE — DIAGNOSTIC IMAGING REPORT
INDICATION: Laparoscopic cholecystectomy.



EXAMINATION: Fluoroscopic evaluation of the right upper quadrant

from 08/30/2022.



FINDINGS: Multiple limited intraoperative fluoroscopic images are

provided. The common duct appears grossly dilated but cannot be

measured on the images provided. No persistent filling defects

appreciated. Visualized cystic duct is also grossly distended

subjectively. 1 minute 29 seconds fluoroscopy time used.



IMPRESSION:

1. Limited intraoperative evaluation with suspected dilatation of

the common duct. No obstructive process.



Dictated by: 



  Dictated on workstation # [id number]

## 2022-08-30 NOTE — DIAGNOSTIC IMAGING REPORT
PROCEDURE: CT abdomen and pelvis with contrast.



TECHNIQUE: Multiple contiguous axial images were obtained through

the abdomen and pelvis after administration of intravenous

contrast. Auto Exposure Controls were utilized during the CT exam

to meet ALARA standards for radiation dose reduction. All CT

scans use one or more of the following dose optimizing

techniques: automated exposure control, MA and/or KvP adjustment

based on patient size and exam type or iterative reconstruction.



INDICATION:  Left upper quadrant pain



Lung bases are clear. Liver appears normal. Gallbladder appears

normal. Portal vein is patent. Common duct is upper limits of

normal. Pancreas appears normal. Spleen is not enlarged. Kidneys

and adrenals appear normal. There is contrast opacification

within the calyces. Small calculi could be obscured. There is

moderate amount of food residue in the stomach. Small bowel is

not dilated. There is no evidence of appendicitis. Uterus and

adnexa are unremarkable. There is a trace amount of free fluid in

the cul-de-sac. There is no intraperitoneal free air. There is

diverticulosis of the colon but no evidence of diverticulitis.



IMPRESSION: Uncomplicated diverticulosis of the colon. Retained

food residue in the stomach. No acute abnormality seen.



I agree with preliminary interpretation.



Dictated by: 



  Dictated on workstation # [id number]

## 2022-08-30 NOTE — PROGRESS NOTE-POST OPERATIVE
Post-Operative Progess Note


Surgeon (s)/Assistant (s)


Surgeon


ASIM MENDOZA DO


Assistant:  Misbah





Pre-Operative Diagnosis


Cholecystitis/Cholelithiasis possible Choledochalithiasis





Post-Operative Diagnosis





Acute Cholecystitis/Cholelithiasis with Choledochalithiasis almost


obstructed





Procedure & Operative Findings


Date of Procedure


8/30/22


Procedure Performed/Findings


PROCEDURE: Laparoscopic cholecystectomy with intraoperative cholangiogram. 


           Common Bile duct Exploration thru cystic duct


 


COMPLICATIONS: None. 


 


PROCEDURE:


The patient was taken to the operating suite and was prepped and draped 


in sterile fashion. A surgical pause was performed. Just superior to the 


umbilicus, a 12 mm incision was made. Dissection was taken down to the fascia, 


which was then scored and grasped with a Kocher and the abdomen was then 


entered.  An 0 Vicryl suture was placed in a figure-of-eight fashion and a 


Velasquez trocar was placed and secured. Pneumoperitoneum was achieved. A 5mm 


trochar place in the subxyphoid and 2 in the right upper quadrant. Upon 


entering the gallbladder was noted to be enlarged and injected; looked like


an acute cholecystitis.  There were adhesions noted, which indicate previous


attacks.  There was a lot of edema around the gallbladder and some bile 


staining was noted.  The gallbladder was then grasped at the fundus and 


elevated. Another grasped at Rich's pouch and pulled in the inferolateral


direction. The cystic duct, and cystic artery were then dissected out. The


cystic artery was in front of duct; so two clips placed distally,one superiorly 


and then it was transected with scissors. Clipwas placed on the distal portion 


of the cystic duct which was then partially transected. An arrow catheter was 


inserted into the duct. The cholangiogram was then performed and we saw a 


filling defect at the opening to small intestine; however contrast did make 


its way into the duodenum. Elected to try and do common bile duct exploration


to remove the choledochal stone.  First tried to pull stone up and out of the


cystic duct; unsuccessful.  Next blew up the catheter balloon and pushed down


and could feel it pop into the duodenum.  Then shot another cholangiogram and


did not see the calculus.  At this point the catheter was removed. Clips were 


placed on proximal portion of the cystic duct and then the duct was then fritz-


sected. Hook cautery was used to dissect the gallbladder from the gallbladder


fossa achieving hemostasis. The gallbladder was placed in an Endobag and removed




through the 12 mm trocar site. The abdomen was then reinspected.  Copious 

amounts 


of irrigation were used to irrigate the abdomen and there were no signs of 

active 


bleeding. Hemostasis had been achieved. The 12 mm fascial defect was then closed




with the 0 Vicryl suture that had been placed in a figure-of-eight fashion. The 


abdomen was then desufflated, the trocars were removed. The abdomen was then 


washed and dried. The skin was then closed using 4-0 Monocryl in a subcuticular 


fashion. The abdomen was washed and dried and Skin Affix was place over 

incisions.


Patient tolerated the procedure well without any complications and was taken to 


the recovery room in stable condition.








Dr. Crawley assisted on this case helping to make incisions, close incisions, 

identify


anatomy and hold anatomy out of the way.


Anesthesia Type


GET





Estimated Blood Loss


Estimated blood loss (mL):  less than 10ml





Specimens/Packing


Specimens Removed


GB and contents











ASIM MENDOZA DO               Aug 30, 2022 20:57

## 2022-08-30 NOTE — DIAGNOSTIC IMAGING REPORT
CLINICAL INDICATION: Patient with right upper quadrant pain.



EXAM: Right upper quadrant ultrasound.



COMPARISON: CT scan of the abdomen and pelvis with contrast dated

08/30/2022.



FINDINGS:

There is bowel gas which obscures some portions of the pancreatic

tail. Otherwise visualized portion of pancreas is unremarkable.



The visualized portions of the abdominal aorta and IVC show no

significant abnormality.



Liver measures 14.8 cm. The liver has normal echogenicity and

echotexture. The liver surface is smooth. There is no liver mass.

The main portal vein demonstrates hepatopetal flow. Common bile

duct is dilated at 9 mm. There is no definite stone in the common

duct seen. There is no intrahepatic ductal dilation. There are

multiple echogenic areas seen within the gallbladder with no

significant posterior shadowing. These may represent stones

and/or sludge. Gallbladder is moderately fluid filled. The

gallbladder wall measures 3 mm. There is no sonographic Hendrix

sign.



Right kidney measures 11.9 cm in craniocaudal dimension.  There

is no hydronephrosis or mass. There is no abdominal ascites.



IMPRESSION:

1: There are multiple stones and/or sludge within the gallbladder

with mild gallbladder wall thickening. Gallbladder is moderately

distended. There is no sonographic Hendrix sign. There is dilation

of the common duct which measures 9 mm. There is no stone in the

region of the common hepatic duct. MRCP would help better

evaluate to exclude a stone in the common hepatic duct. Given the

above findings, acute cholecystitis cannot be completely

excluded.



2: The remainder of the exam shows no other significant

abnormality.



Dictated by: 



  Dictated on workstation # [id number]

## 2022-08-30 NOTE — ED ABDOMINAL PAIN
General


Chief Complaint:  Abdominal/GI Problems


Stated Complaint:  ABD PAIN


Nursing Triage Note:  


pt presents with complains of umbilical location abd pain for 12 hours. pt 


stated "i think im having pancreatitis". pt denies ever having pancreatitis 


before. pt reports nausea but denies vomiting and diarrhea.





History of Present Illness


Date Seen by Provider:  Aug 30, 2022


Time Seen by Provider:  03:10


Initial Comments


47-year-old female with PMH of HTN, is here with complaints of epigastric and 

left upper quadrant pain which has been going on for the past 12 hours, 

intermittent in nature ranging from 2-3/10 to 6-8/10. Pt states she notices pain

whenever she eats food that is very sweet. It is associated with nausea.  Denies

fever, diarrhea and vomiting, constipation, chest pain, palpitations, dysuria. 

Pt does not drink alcohol or smoke.





Allergies and Home Medications


Allergies


Coded Allergies:  


     No Known Drug Allergies (Unverified , 13)





Patient Home Medication List


Home Medication List Reviewed:  Yes


No Active Prescriptions or Reported Meds





Review of Systems


Review of Systems


Constitutional:  no symptoms reported


EENTM:  No Symptoms Reported


Respiratory:  No Symptoms Reported


Cardiovascular:  No Symptoms Reported


Gastrointestinal:  Abdominal Pain, Nausea


Genitourinary:  No Symptoms Reported


Musculoskeletal:  no symptoms reported


Skin:  no symptoms reported


Psychiatric/Neurological:  No Symptoms Reported


Endocrine:  No Symptoms Reported


Hematologic/Lymphatic:  No Symptoms Reported





Past Medical-Social-Family Hx


Patient Social History


Tobacco Use?:  No


Substance use?:  No


Alcohol Use?:  No


Pt feels they are or have been:  No





Immunizations Up To Date


Tetanus Booster (TDap):  More than 5yrs


Influenza Vaccine Up-to-Date:  No; Not Current


First/Initial COVID19 Vaccinat:  unknown date


Second COVID19 Vaccination Morgan:  unknown date


COVID19 Vaccine :  moderna





Seasonal Allergies


Seasonal Allergies:  No





Past Medical History


Surgeries:  Yes ( , dental implant)


 Section


Respiratory:  No


Cardiac:  No


Neurological:  No


Last Menstrual Period:  Aug 2, 2022


Reproductive Disorders:  No


Sexually Transmitted Disease:  No


HIV/AIDS:  No


Genitourinary:  No


UTI-Chronic


Gastrointestinal:  Yes (Dairy intolerance)


Musculoskeletal:  No


Endocrine:  No


HEENT:  No


Loss of Vision:  Denies


Hearing Impairment:  Denies


Cancer:  No


Psychosocial:  No


Integumentary:  No


Blood Disorders:  No


Adverse Reaction/Blood Tranf:  No





Family Medical History


Heart Disease, Cancer, Hypertension





Physical Exam


Vital Signs





Vital Signs - First Documented








 22





 02:52


 


Pulse 60


 


Resp 18


 


B/P (MAP) 111/75 (87)


 


Pulse Ox 97


 


O2 Delivery Room Air





Capillary Refill :


Height/Weight/BMI


Height: 5'8.00"


Weight: 242lbs. 0.0oz. 109.374731aq; 36.8 BMI


Method:Estimated


General Appearance:  WD/WN, no apparent distress


HEENT:  PERRL/EOMI


Neck:  full range of motion


Respiratory:  chest non-tender, lungs clear


Cardiovascular:  normal peripheral pulses


Gastrointestinal:  normal bowel sounds, soft, no organomegaly, no pulsatile 

mass, tenderness (LUQ and epigastrium)


Extremities:  normal range of motion


Back:  normal inspection, no CVA tenderness, no vertebral tenderness


Neurologic/Psychiatric:  alert, normal mood/affect, oriented x 3


Skin:  normal color


Lymphatic:  no adenopathy





Progress/Results/Core Measures


Results/Orders


Lab Results





Laboratory Tests








Test


 22


02:50 22


04:30 Range/Units


 


 


White Blood Count


 8.9 


 


 4.3-11.0


10^3/uL


 


Red Blood Count


 4.54 


 


 3.80-5.11


10^6/uL


 


Hemoglobin 14.5   11.5-16.0  g/dL


 


Hematocrit 42   35-52  %


 


Mean Corpuscular Volume 93   80-99  fL


 


Mean Corpuscular Hemoglobin 32   25-34  pg


 


Mean Corpuscular Hemoglobin


Concent 34 


 


 32-36  g/dL





 


Red Cell Distribution Width 12.7   10.0-14.5  %


 


Platelet Count


 331 


 


 130-400


10^3/uL


 


Mean Platelet Volume 10.5   9.0-12.2  fL


 


Immature Granulocyte % (Auto) 0    %


 


Neutrophils (%) (Auto) 70   42-75  %


 


Lymphocytes (%) (Auto) 21   12-44  %


 


Monocytes (%) (Auto) 8   0-12  %


 


Eosinophils (%) (Auto) 1   0-10  %


 


Basophils (%) (Auto) 0   0-10  %


 


Neutrophils # (Auto)


 6.2 


 


 1.8-7.8


10^3/uL


 


Lymphocytes # (Auto)


 1.9 


 


 1.0-4.0


10^3/uL


 


Monocytes # (Auto)


 0.7 


 


 0.0-1.0


10^3/uL


 


Eosinophils # (Auto)


 0.1 


 


 0.0-0.3


10^3/uL


 


Basophils # (Auto)


 0.0 


 


 0.0-0.1


10^3/uL


 


Immature Granulocyte # (Auto)


 0.0 


 


 0.0-0.1


10^3/uL


 


Sodium Level 138   135-145  MMOL/L


 


Potassium Level 3.7   3.6-5.0  MMOL/L


 


Chloride Level 103     MMOL/L


 


Carbon Dioxide Level 23   21-32  MMOL/L


 


Anion Gap 12   5-14  MMOL/L


 


Blood Urea Nitrogen 11   7-18  MG/DL


 


Creatinine


 0.71 


 


 0.60-1.30


MG/DL


 


Estimat Glomerular Filtration


Rate 105 


 


  





 


BUN/Creatinine Ratio 15    


 


Glucose Level 126 H    MG/DL


 


Calcium Level 9.2   8.5-10.1  MG/DL


 


Corrected Calcium 9.1   8.5-10.1  MG/DL


 


Total Bilirubin 2.2 H  0.1-1.0  MG/DL


 


Aspartate Amino Transf


(AST/SGOT) 417 H


 


 5-34  U/L





 


Alanine Aminotransferase


(ALT/SGPT) 372 H


 


 0-55  U/L





 


Alkaline Phosphatase 87     U/L


 


Troponin I < 0.028   <0.028  NG/ML


 


Total Protein 7.1   6.4-8.2  GM/DL


 


Albumin 4.1   3.2-4.5  GM/DL


 


Lipase 28   8-78  U/L


 


Urine Color  ORANGE   


 


Urine Clarity  CLEAR   


 


Urine pH  6.5  5-9  


 


Urine Specific Gravity  1.015 L 1.016-1.022  


 


Urine Protein  TRACE H NEGATIVE  


 


Urine Glucose (UA)  NEGATIVE  NEGATIVE  


 


Urine Ketones  2+ H NEGATIVE  


 


Urine Nitrite  NEGATIVE  NEGATIVE  


 


Urine Bilirubin  1+ H NEGATIVE  


 


Urine Urobilinogen  2.0  < = 1.0  MG/DL


 


Urine Leukocyte Esterase  NEGATIVE  NEGATIVE  


 


Urine RBC (Auto)  NEGATIVE  NEGATIVE  


 


Urine RBC  NONE   /HPF


 


Urine WBC  NONE   /HPF


 


Urine Squamous Epithelial


Cells 


 2-5 


  /HPF





 


Urine Crystals  NONE   /LPF


 


Urine Bacteria  NEGATIVE   /HPF


 


Urine Casts  NONE   /LPF


 


Urine Mucus  MODERATE H  /LPF


 


Urine Culture Indicated  NO   


 


Urine Opiates Screen  NEGATIVE  NEGATIVE  


 


Urine Oxycodone Screen  NEGATIVE  NEGATIVE  


 


Urine Methadone Screen  NEGATIVE  NEGATIVE  


 


Urine Propoxyphene Screen  NEGATIVE  NEGATIVE  


 


Urine Barbiturates Screen  NEGATIVE  NEGATIVE  


 


Ur Tricyclic Antidepressants


Screen 


 NEGATIVE 


 NEGATIVE  





 


Urine Phencyclidine Screen  NEGATIVE  NEGATIVE  


 


Urine Amphetamines Screen  NEGATIVE  NEGATIVE  


 


Urine Methamphetamines Screen  NEGATIVE  NEGATIVE  


 


Urine Benzodiazepines Screen  NEGATIVE  NEGATIVE  


 


Urine Cocaine Screen  NEGATIVE  NEGATIVE  


 


Urine Cannabinoids Screen  NEGATIVE  NEGATIVE  








My Orders





Orders - BERNIE,RADU L MD


Comprehensive Metabolic Panel (22 03:17)


Lipase (22 03:17)


Ua Culture If Indicated (22 03:17)


Ed Iv/Invasive Line Start (22 03:17)


Cbc With Automated Diff (22 03:17)


Ct Abdomen/Pelvis W (22 03:17)


Ketorolac Injection (Toradol Injection) (22 03:30)


Drug Screen Stat (Urine) (22 03:17)


Troponin I Pickett (22 03:17)


Ed Iv/Invasive Line Start (22 03:26)


Ns Iv 1000 Ml (Sodium Chloride 0.9%) (22 03:30)


Ondansetron Injection (Zofran Injectio (22 03:30)


Iohexol Injection (Omnipaque 350 Mg/Ml 1 (22 04:15)


Sodium Chloride Flush (Catheter Flush Sy (22 04:15)


Ns (Ivpb) (Sodium Chloride 0.9% Ivpb Bag (22 04:15)





Medications Given in ED





Current Medications








 Medications  Dose


 Ordered  Sig/Marleny


 Route  Start Time


 Stop Time Status Last Admin


Dose Admin


 


 Iohexol  100 ml  ONCE  ONCE


 IV  22 04:15


 22 04:16 DC 22 04:14


80 ML


 


 Sodium Chloride  10 ml  AS NEEDED  PRN


 IV  22 04:15


    22 04:15


10 ML


 


 Sodium Chloride  100 ml  ONCE  ONCE


 IV  22 04:15


 22 04:16 DC 22 04:15


80 ML








Vital Signs/I&O











 22





 02:52


 


Pulse 60


 


Resp 18


 


B/P (MAP) 111/75 (87)


 


Pulse Ox 97


 


O2 Delivery Room Air














Blood Pressure Mean:                    87











Progress


Progress Note :  


Progress Note


1. LUQ & EPIGASTRIC PAIN: COMMON BILE DUCT DILATATION: POSSIBLE OBSTRUCTION


- CT ABD & PELVIS: Dilatation of the common bile duct measuring up to 8 mm with 

periportal edema.  If further concern for biliary obstruction, consider MRCP.


Biliary calculus identified.  No evidence of pancreatitis.


- Labs: Bili is 2.2, AST/ ALT ratio is 417/372


- Troponin negative


- Lipase negative


- Toradol and ZOfran ordered but pt refused these medications. 


- NS IVF


- Discussed with Dr Schuler and will admit to obs for Ultrasound and MRCP





Diagnostic Imaging





   Diagonstic Imaging:  CT


   Plain Films/CT/US/NM/MRI:  abdomen





Departure


Impression





   Primary Impression:  


   Epigastric abdominal pain


   Additional Impression:  


   Common bile duct dilatation


Disposition:  30 STILL A PATIENT


Condition:  Stable





Admissions


Decision to Admit Reason:  Admit from ER (General)


Decision to Admit/Date:  Aug 30, 2022


Time/Decision to Admit Time:  06:30





Departure-Patient Inst.


Referrals:  


HAWA DOWNS MD (PCP)


Primary Care Physician








NO,LOCAL PHYSICIAN (Family)


Primary Care Physician


Scripts


No Active Prescriptions or Reported Meds











RADU GIBBS MD              Aug 30, 2022 03:17

## 2022-08-30 NOTE — ANESTHESIA-GENERAL POST-OP
General


Patient Condition


Mental Status/LOC:  Same as Preop


Cardiovascular:  Satisfactory


Nausea/Vomiting:  Absent


Respiratory:  Satisfactory


Pain:  Controlled


Complications:  Absent





Post Op Complications


Complications


None





Follow Up Care/Instructions


Patient Instructions


None needed.





Anesthesia/Patient Condition


Patient Condition


Patient is doing well, no complaints, stable vital signs, no apparent adverse 

anesthesia problems.   


No complications reported per nursing.











CELE MAC CRNA            Aug 30, 2022 18:24

## 2022-08-31 VITALS — SYSTOLIC BLOOD PRESSURE: 116 MMHG | DIASTOLIC BLOOD PRESSURE: 68 MMHG

## 2022-08-31 VITALS — SYSTOLIC BLOOD PRESSURE: 103 MMHG | DIASTOLIC BLOOD PRESSURE: 60 MMHG

## 2022-08-31 VITALS — DIASTOLIC BLOOD PRESSURE: 70 MMHG | SYSTOLIC BLOOD PRESSURE: 141 MMHG

## 2022-08-31 VITALS — DIASTOLIC BLOOD PRESSURE: 74 MMHG | SYSTOLIC BLOOD PRESSURE: 125 MMHG

## 2022-08-31 LAB
ALBUMIN SERPL-MCNC: 3.1 GM/DL (ref 3.2–4.5)
ALP SERPL-CCNC: 85 U/L (ref 40–136)
ALT SERPL-CCNC: 508 U/L (ref 0–55)
BASOPHILS # BLD AUTO: 0 10^3/UL (ref 0–0.1)
BASOPHILS NFR BLD AUTO: 0 % (ref 0–10)
BILIRUB SERPL-MCNC: 1.1 MG/DL (ref 0.1–1)
BUN/CREAT SERPL: 12
CALCIUM SERPL-MCNC: 8.4 MG/DL (ref 8.5–10.1)
CHLORIDE SERPL-SCNC: 110 MMOL/L (ref 98–107)
CO2 SERPL-SCNC: 16 MMOL/L (ref 21–32)
CREAT SERPL-MCNC: 0.52 MG/DL (ref 0.6–1.3)
EOSINOPHIL # BLD AUTO: 0 10^3/UL (ref 0–0.3)
EOSINOPHIL NFR BLD AUTO: 0 % (ref 0–10)
GFR SERPLBLD BASED ON 1.73 SQ M-ARVRAT: 115 ML/MIN
GLUCOSE SERPL-MCNC: 93 MG/DL (ref 70–105)
HCT VFR BLD CALC: 36 % (ref 35–52)
HGB BLD-MCNC: 12.4 G/DL (ref 11.5–16)
LIPASE SERPL-CCNC: 17 U/L (ref 8–78)
LYMPHOCYTES # BLD AUTO: 1.6 10^3/UL (ref 1–4)
LYMPHOCYTES NFR BLD AUTO: 19 % (ref 12–44)
MANUAL DIFFERENTIAL PERFORMED BLD QL: NO
MCH RBC QN AUTO: 32 PG (ref 25–34)
MCHC RBC AUTO-ENTMCNC: 34 G/DL (ref 32–36)
MCV RBC AUTO: 93 FL (ref 80–99)
MONOCYTES # BLD AUTO: 0.6 10^3/UL (ref 0–1)
MONOCYTES NFR BLD AUTO: 7 % (ref 0–12)
NEUTROPHILS # BLD AUTO: 6.5 10^3/UL (ref 1.8–7.8)
NEUTROPHILS NFR BLD AUTO: 74 % (ref 42–75)
PLATELET # BLD: 239 10^3/UL (ref 130–400)
PMV BLD AUTO: 10.5 FL (ref 9–12.2)
POTASSIUM SERPL-SCNC: 3.8 MMOL/L (ref 3.6–5)
PROT SERPL-MCNC: 5.4 GM/DL (ref 6.4–8.2)
SODIUM SERPL-SCNC: 137 MMOL/L (ref 135–145)
WBC # BLD AUTO: 8.9 10^3/UL (ref 4.3–11)

## 2022-08-31 RX ADMIN — HYDROCODONE BITARTRATE AND ACETAMINOPHEN PRN EA: 5; 325 TABLET ORAL at 10:48

## 2022-08-31 RX ADMIN — SODIUM CHLORIDE SCH MLS/HR: 900 INJECTION, SOLUTION INTRAVENOUS at 00:51

## 2022-08-31 RX ADMIN — SODIUM CHLORIDE SCH MLS/HR: 900 INJECTION, SOLUTION INTRAVENOUS at 07:39

## 2022-08-31 RX ADMIN — HYDROCODONE BITARTRATE AND ACETAMINOPHEN PRN EA: 5; 325 TABLET ORAL at 00:51

## 2022-08-31 NOTE — DISCHARGE INST-SURGICAL
Discharge Inst-Surgical


Depart Medication/Instructions


New, Converted or Re-Newed RX:  Transmitted to Pharmacy


Patient Instructions


Follow up Appt:


Make appointment for 1 week. [phone]





Instructions:


No lifting greater than 20 pounds.


No strenuous activity. 


May shower in 24 hours, no tub bath or soaking.


Use incentive spirometer at home as directed.


No Smoking





Skin/Wound Care:


May remove bandages in am.  You need to leave the Dermabond on incision it will 

fall off on it's own. 





Symptoms to Report:


Appetite Changes, Extremity Discoloration, Numbness/Tingling, Swelling 

Increased, Bleeding Excessive, Eyesight Changes, Pain Increased, Urine Color 

Change, Constipation(Persistent), Fever over 101 degree F, Pain/Pressure in 

chest, Urinating Difficulty, Cough Up/Vomit Blood, Heart Beat Irreg/Pounding, 

Pain/Pressure in jaw, Cramps in feet or legs, Lightheadedness, Pain/Pressure in 

shoulder, Diarrhea(Persistent), Memory Changes Suddenly, Questions/Concerns, 

Weight gain consecutive days, Dizziness/Fainting, Nausea/Vomiting, Shortness of 

Breath, Weight gain over 2 pounds








If questions or concerns contact your physician 


Or seek help at emergency department.





Activity


Activity as Tolerated:  Yes


Activity Instructions:  Avoid Stress to Incision


Driving Instructions:  No Driving/Refer to 





Diet


Discharge Diet:  Avoid Fatty Foods, Low Fat/Low Cholesterol


Diet After 24 Hours:  Clear Liquid if Nauseous


If Any Problems/Questions/Issu:  Contact Your Physician, Go to Emergency Room





Skin/Wound Care


Infection Signs and Symptoms:  Increased Redness, Foul Odor of Wound, Increased 

Drainage, Skin Itchy or Has a Rash, Increased Swelling, Temperature Above 101  F


Bathing Instructions:  Shower


Stitches/Staples/Dermabond Dis:  Dermabond











ASIM MENDOZA DO               Aug 31, 2022 15:53

## 2022-08-31 NOTE — PROGRESS NOTE - SURGERY
ZACHARY COSTA 8/31/22 0732:


Subjective


Date Seen by a Provider:  Aug 31, 2022


Subjective/Events-last exam


Pt s/p laparoscopic cholecystectomy, tolerated well. States her pain is well 

controlled this morning and she had no problems throughout the night. She denies

fever, chills, night sweats, N/V, chest pain, SOB or any other medical 

complaints. States she is drinking and urinating without difficulty. She has not

had a bowel movement yet.


Review of Systems


General:  No Chills, No Night Sweats, No Fatigue


HEENT:  No Visual Changes


Pulmonary:  No Dyspnea, No Cough


Cardiovascular:  No: Chest Pain


Gastrointestinal:  Abdominal Pain (minimal ); No: Nausea, Vomiting, Diarrhea, 

Constipation


Genitourinary:  No Retention





Objective


Exam





Vital Signs








  Date Time  Temp Pulse Resp B/P (MAP) Pulse Ox O2 Delivery O2 Flow Rate FiO2


 


8/31/22 03:38 36.9 70 18 125/74 (91) 97 Room Air  


 


8/30/22 23:21 37.6 90 18 130/80 (97) 95 Room Air  


 


8/30/22 20:06      Room Air  


 


8/30/22 20:00 37.4 73 18 153/76 (101) 96 Room Air  


 


8/30/22 19:30     97 Room Air  


 


8/30/22 19:20 36.1  20 129/81 (97) 94 Room Air  


 


8/30/22 19:20      Room Air  


 


8/30/22 19:15      Room Air  


 


8/30/22 19:10   20 125/82 (96) 94 Room Air  


 


8/30/22 19:00      OxyMask 2.00 


 


8/30/22 19:00   20 134/80 (98) 97 OxyMask 2.00 


 


8/30/22 18:50   20 132/67 (88) 99 OxyMask 3.00 


 


8/30/22 18:45      OxyMask 3.00 


 


8/30/22 18:40   20 130/60 (83) 99 OxyMask 3.00 


 


8/30/22 18:30      OxyMask 6.00 


 


8/30/22 18:30   20 132/67 (88) 100 OxyMask 6.00 


 


8/30/22 18:18      OxyMask 6.00 


 


8/30/22 18:18 36.1  20 117/64 (81) 97 OxyMask 6.00 


 


8/30/22 16:00 37.5 76 18 137/81 (99) 97 Room Air  


 


8/30/22 11:53 37.2 80 18 124/77 (93) 99 Room Air  


 


8/30/22 08:19     97 Room Air  


 


8/30/22 08:00 37.2 69 16 128/82 (97) 97 Room Air  














I & O 


 


 8/31/22





 07:00


 


Intake Total 1650 ml


 


Balance 1650 ml





Capillary Refill : Less Than 3 Seconds


General Appearance:  No Apparent Distress, WD/WN


HEENT:  PERRL/EOMI, Moist Mucous Membranes


Neck:  Non Tender, Supple


Respiratory:  Lungs Clear, Normal Breath Sounds, No Accessory Muscle Use, No 

Respiratory Distress


Cardiovascular:  Regular Rate, Rhythm, No Gallop, No Murmur


Peripheral Pulses:  2+ Radial Pulses (R), 2+ Radial Pulses (L)


Gastrointestinal:  normal bowel sounds, non tender, soft, no organomegaly, 

hernia (umbilical), other (inscisions are clean dry and intact, with no 

surrounding erythema. )


Extremity:  Non Tender, No Calf Tenderness, Pedal Edema (very mild pedal edema)


Neurologic/Psychiatric:  Alert, Oriented x3, Normal Mood/Affect


Skin:  Normal Color, Warm/Dry


Lymphatic:  No Adenopathy (of supraclavicular or axillary LNs)





Results


Lab


Laboratory Tests


8/31/22 05:27: 


White Blood Count 8.9, Red Blood Count 3.89, Hemoglobin 12.4, Hematocrit 36, 

Mean Corpuscular Volume 93, Mean Corpuscular Hemoglobin 32, Mean Corpuscular 

Hemoglobin Concent 34, Red Cell Distribution Width 12.7, Platelet Count 239, 

Mean Platelet Volume 10.5, Immature Granulocyte % (Auto) 1, Neutrophils (%) 

(Auto) 74, Lymphocytes (%) (Auto) 19, Monocytes (%) (Auto) 7, Eosinophils (%) 

(Auto) 0, Basophils (%) (Auto) 0, Neutrophils # (Auto) 6.5, Lymphocytes # (Auto)

1.6, Monocytes # (Auto) 0.6, Eosinophils # (Auto) 0.0, Basophils # (Auto) 0.0, 

Immature Granulocyte # (Auto) 0.0, Sodium Level 137, Potassium Level 3.8, 

Chloride Level 110H, Carbon Dioxide Level 16L, Anion Gap 11, Blood Urea Nitrogen

6L, Creatinine 0.52L, Estimat Glomerular Filtration Rate 115, BUN/Creatinine 

Ratio 12, Glucose Level 93, Calcium Level 8.4L, Corrected Calcium 9.1, Total 

Bilirubin 1.1H, Aspartate Amino Transf (AST/SGOT) 214H, Alanine Aminotransferase

(ALT/SGPT) 508#H, Alkaline Phosphatase 85, Total Protein 5.4L, Albumin 3.1L, 

Lipase 17





Assessment/Plan


Acute Cholecystitis/Cholelithiasis with Choledochalithiasis almost obstructed








S/p laparoscopic cholecystectomy with intraoperative cholangiogram and CBD 

exploration, tolerating well. AST and ALT elevated from yesterday. Bilirubin has

decreased to 1.1 from yesterday. Pt is hemodynamically stable with pain well 

controlled. Will continue to monitor labs. She may need an ERCP.





MOOSE SCHULER DO 8/31/22 1653:


Subjective


Time Seen by a Provider:  14:14


Subjective/Events-last exam


Pt seen and examined, states she feels really good today and is tolerating diet.

 Wants to go home


Review of Systems


General:  No Chills, No Night Sweats


Pulmonary:  No Dyspnea, No Cough


Cardiovascular:  No: Chest Pain


Gastrointestinal:  Abdominal Pain (minimal ); No: Nausea, Vomiting





Objective


Exam


General Appearance:  No Apparent Distress


HEENT:  PERRL/EOMI, Moist Mucous Membranes


Respiratory:  Lungs Clear, Normal Breath Sounds, No Accessory Muscle Use


Cardiovascular:  Regular Rate, Rhythm, No Murmur


Gastrointestinal:  soft, no organomegaly, tenderness (at incision), hernia 

(umbilical), other (inscisions are clean dry and intact, with no surrounding 

erythema. )





Assessment/Plan


S/p laparoscopic cholecystectomy with intraoperative cholangiogram and CBD 

exploration 





Tolerating diet, Bilirubin has decreased to 1.1 from yesterday. Pt is 

hemodynamically stable with pain well controlled. Will d/c pt home





Supervisory-Addendum Brief


Verification & Attestation


Participated in pt care:  history, MDM, physical


Personally performed:  exam, history, MDM, supervision of care


Care discussed with:  Medical Student


Procedures:  n/a


Verification and Attestation of Medical Student E/M Service





A medical student performed and documented this service. I then reviewed and 

verified all information documented by the medical student and made 

modifications to such information, when appropriate. I personally performed a 

physical exam, medical decision making and then discussed any differences 

between the notes and made revisions as necessary to create one note.





Moose Schuler , 8/31/22 , 16:53











ZACHARY COSTA                    Aug 31, 2022 07:32


MOOSE SCHULER DO               Aug 31, 2022 16:53